# Patient Record
Sex: FEMALE | Race: AMERICAN INDIAN OR ALASKA NATIVE
[De-identification: names, ages, dates, MRNs, and addresses within clinical notes are randomized per-mention and may not be internally consistent; named-entity substitution may affect disease eponyms.]

---

## 2018-10-19 ENCOUNTER — HOSPITAL ENCOUNTER (EMERGENCY)
Dept: HOSPITAL 5 - ED | Age: 28
Discharge: HOME | End: 2018-10-19
Payer: SELF-PAY

## 2018-10-19 VITALS — SYSTOLIC BLOOD PRESSURE: 119 MMHG | DIASTOLIC BLOOD PRESSURE: 81 MMHG

## 2018-10-19 DIAGNOSIS — J02.0: Primary | ICD-10-CM

## 2018-10-19 PROCEDURE — 96372 THER/PROPH/DIAG INJ SC/IM: CPT

## 2018-10-19 PROCEDURE — 96374 THER/PROPH/DIAG INJ IV PUSH: CPT

## 2018-10-19 PROCEDURE — 87430 STREP A AG IA: CPT

## 2018-10-19 PROCEDURE — 99283 EMERGENCY DEPT VISIT LOW MDM: CPT

## 2018-10-19 NOTE — EMERGENCY DEPARTMENT REPORT
ED ENT HPI





- General


Chief complaint: Sore Throat


Stated complaint: STREP THROAT


Time Seen by Provider: 10/19/18 23:14


Source: patient


Mode of arrival: Ambulatory


Limitations: No Limitations





- History of Present Illness


Initial comments: 


Patient presents to [facility] with fever 3 days clear postnasal drip will 

swelling pain 5/10 MAXIMUM TEMPERATURE 102 Fahrenheit orally





MD complaint: sore throat


Onset/Timing: 3


-: days(s)


Location: throat


Severity: moderate


Severity scale (0 -10): 5


Quality: aching, sharp


Consistency: intermittent


Improves with: none


Worsens with: swallowing


Associated Symptoms: fever, pain with swallowing, sore throat





- Related Data


 Previous Rx's











 Medication  Instructions  Recorded  Last Taken  Type


 


Benzocaine/Menth/Cetylpyrd 8 each MM Q2H #3 packet 10/19/18 Unknown Rx





[Cepacol X Strength]    


 


Dexamethasone [Decadron] 4 mg PO Q12H #4 tablet 10/19/18 Unknown Rx


 


Ibuprofen 800 mg PO TID PRN #30 tablet 10/19/18 Unknown Rx











 Allergies











Allergy/AdvReac Type Severity Reaction Status Date / Time


 


No Known Allergies Allergy   Verified 10/19/18 15:48














ED Dental HPI





- General


Chief complaint: Sore Throat


Stated complaint: STREP THROAT


Time Seen by Provider: 10/19/18 23:14


Source: patient


Mode of arrival: Ambulatory


Limitations: No Limitations





- Related Data


 Previous Rx's











 Medication  Instructions  Recorded  Last Taken  Type


 


Benzocaine/Menth/Cetylpyrd 8 each MM Q2H #3 packet 10/19/18 Unknown Rx





[Cepacol X Strength]    


 


Dexamethasone [Decadron] 4 mg PO Q12H #4 tablet 10/19/18 Unknown Rx


 


Ibuprofen 800 mg PO TID PRN #30 tablet 10/19/18 Unknown Rx











 Allergies











Allergy/AdvReac Type Severity Reaction Status Date / Time


 


No Known Allergies Allergy   Verified 10/19/18 15:48














ED Review of Systems


ROS: 


Stated complaint: STREP THROAT


Other details as noted in HPI





Constitutional: denies: chills, fever


Eyes: denies: eye pain, eye discharge, vision change


ENT: throat pain


Respiratory: denies: cough, shortness of breath, wheezing


Cardiovascular: denies: chest pain, palpitations


Endocrine: no symptoms reported


Gastrointestinal: denies: abdominal pain, nausea, diarrhea


Genitourinary: denies: urgency, dysuria, discharge


Musculoskeletal: denies: back pain, joint swelling, arthralgia


Skin: denies: rash, lesions


Neurological: denies: headache, weakness, paresthesias


Psychiatric: denies: anxiety, depression


Hematological/Lymphatic: denies: easy bleeding, easy bruising





ED Past Medical Hx





- Past Medical History


Previous Medical History?: No





- Surgical History


Past Surgical History?: No





- Social History


Smoking Status: Never Smoker


Substance Use Type: None





- Medications


Home Medications: 


 Home Medications











 Medication  Instructions  Recorded  Confirmed  Last Taken  Type


 


Benzocaine/Menth/Cetylpyrd 8 each MM Q2H #3 packet 10/19/18  Unknown Rx





[Cepacol X Strength]     


 


Dexamethasone [Decadron] 4 mg PO Q12H #4 tablet 10/19/18  Unknown Rx


 


Ibuprofen 800 mg PO TID PRN #30 tablet 10/19/18  Unknown Rx














ED Physical Exam





- General


Limitations: No Limitations


General appearance: alert, in no apparent distress





- Head


Head exam: Present: atraumatic, normocephalic





- Eye


Eye exam: Present: normal appearance, PERRL, EOMI


Pupils: Present: normal accommodation





- ENT


ENT exam: Present: TM's normal bilaterally, normal external ear exam





- Expanded ENT Exam


  ** Expanded


Throat exam: Positive: tonsillar erythema, tonsillomegaly, tonsillar exudate.  

Negative: R peritonsillar mass, L peritonsillar mass





- Neck


Neck exam: Present: normal inspection, full ROM, lymphadenopathy.  Absent: 

tenderness, meningismus, thyromegaly





- Respiratory


Respiratory exam: Present: normal lung sounds bilaterally.  Absent: respiratory 

distress, wheezes, stridor, chest wall tenderness





- Cardiovascular


Cardiovascular Exam: Present: regular rate, normal rhythm.  Absent: systolic 

murmur, diastolic murmur, rubs, gallop





- GI/Abdominal


GI/Abdominal exam: Present: soft, normal bowel sounds





- Rectal


Rectal exam: Present: deferred





- Extremities Exam


Extremities exam: Present: normal inspection





- Back Exam


Back exam: Present: normal inspection





- Neurological Exam


Neurological exam: Present: alert, oriented X3





- Psychiatric


Psychiatric exam: Present: normal affect, normal mood





- Skin


Skin exam: Present: warm, dry, intact, normal color.  Absent: rash





ED Course





 Vital Signs











  10/19/18





  15:49


 


Temperature 100.1 F H


 


Pulse Rate 90


 


Respiratory 16





Rate 


 


Blood Pressure 119/81


 


O2 Sat by Pulse 100





Oximetry 














ED Medical Decision Making





- Medical Decision Making


This is strep throat strep culture positive for strep plan Decadron penicillin 

[facility] IM DC'd home on ibuprofen and Cepacol lozenges patient will follow with PCP 

in 2-3 days patient verbalizes agreement and understanding of same fever and 

pain are reduced at this time patient DC'd home in stable condition





Critical care attestation.: 


If time is entered above; I have spent that time in minutes in the direct care 

of this critically ill patient, excluding procedure time.








ED Disposition


Clinical Impression: 


Pharyngitis


Qualifiers:


 Pharyngitis/tonsillitis etiology: streptococcus Qualified Code(s): J02.0 - 

Streptococcal pharyngitis





Disposition: DC-01 TO HOME OR SELFCARE


Is pt being admited?: No


Does the pt Need Aspirin: No


Condition: Good


Instructions:  Pharyngitis (ED), Strep Throat (ED)


Prescriptions: 


Benzocaine/Menth/Cetylpyrd [Cepacol X Strength] 8 each MM Q2H #3 packet


Dexamethasone [Decadron] 4 mg PO Q12H #4 tablet


Ibuprofen 800 mg PO TID PRN #30 tablet


 PRN Reason: pain fever


Referrals: 


[facility] [Outside] - 3-5 Days


Forms:  Work/School Release Form(ED)


Time of Disposition: 23:43